# Patient Record
Sex: FEMALE | Race: ASIAN | Employment: UNEMPLOYED | URBAN - METROPOLITAN AREA
[De-identification: names, ages, dates, MRNs, and addresses within clinical notes are randomized per-mention and may not be internally consistent; named-entity substitution may affect disease eponyms.]

---

## 2017-02-24 ENCOUNTER — OFFICE VISIT (OUTPATIENT)
Dept: FAMILY MEDICINE CLINIC | Age: 37
End: 2017-02-24

## 2017-02-24 VITALS
BODY MASS INDEX: 21.62 KG/M2 | DIASTOLIC BLOOD PRESSURE: 69 MMHG | OXYGEN SATURATION: 98 % | WEIGHT: 122 LBS | SYSTOLIC BLOOD PRESSURE: 108 MMHG | HEART RATE: 79 BPM | RESPIRATION RATE: 20 BRPM | TEMPERATURE: 98.3 F | HEIGHT: 63 IN

## 2017-02-24 DIAGNOSIS — K21.9 GASTROESOPHAGEAL REFLUX DISEASE, ESOPHAGITIS PRESENCE NOT SPECIFIED: ICD-10-CM

## 2017-02-24 DIAGNOSIS — E55.9 VITAMIN D DEFICIENCY, UNSPECIFIED: ICD-10-CM

## 2017-02-24 DIAGNOSIS — Z00.00 ENCOUNTER FOR WELL ADULT EXAM WITHOUT ABNORMAL FINDINGS: Primary | ICD-10-CM

## 2017-02-24 DIAGNOSIS — E78.00 ELEVATED LDL CHOLESTEROL LEVEL: ICD-10-CM

## 2017-02-24 RX ORDER — RANITIDINE 150 MG/1
150 TABLET, FILM COATED ORAL 2 TIMES DAILY
Qty: 60 TAB | Refills: 0 | Status: SHIPPED | OUTPATIENT
Start: 2017-02-24 | End: 2017-03-26

## 2017-02-24 NOTE — PATIENT INSTRUCTIONS

## 2017-02-24 NOTE — MR AVS SNAPSHOT
Visit Information Date & Time Provider Department Dept. Phone Encounter #  
 2/24/2017  9:15 AM Khadra Thomas MD [facility] [phone] [id number] Upcoming Health Maintenance Date Due  
 PAP AKA CERVICAL CYTOLOGY 3/23/2001 INFLUENZA AGE 9 TO ADULT 8/1/2016 DTaP/Tdap/Td series (2 - Td) 12/22/2020 Allergies as of 2/24/2017  Review Complete On: 2/24/2017 By: Kevin Martins Severity Noted Reaction Type Reactions Mushroom Combination No.1  03/10/2014    Hives Current Immunizations  Reviewed on 2/24/2017 Name Date Influenza Vaccine (Quad) PF 11/3/2015 TDAP Vaccine 12/22/2010 Reviewed by Kevin Martins on 2/24/2017 at  9:30 AM  
You Were Diagnosed With   
  
 Codes Comments Elevated LDL cholesterol level    -  Primary ICD-10-CM: E78.00 ICD-9-CM: 272.0 Vitamin D deficiency, unspecified     ICD-10-CM: E55.9 ICD-9-CM: 268.9 Gastroesophageal reflux disease, esophagitis presence not specified     ICD-10-CM: K21.9 ICD-9-CM: 530.81 Vitals BP  
  
  
  
  
  
 108/69 BMI and BSA Data Body Mass Index Body Surface Area  
 21.61 kg/m 2 1.57 m 2 Preferred Pharmacy Pharmacy Name Phone [id number] [address] [facility] [phone] Your Updated Medication List  
  
   
This list is accurate as of: 2/24/17 10:01 AM.  Always use your most recent med list.  
  
  
  
  
 fluticasone 50 mcg/actuation nasal spray Commonly known as:  FLONASE  
2 sprays each nostril BID for 3 days and then daily for additional 11 days  
  
 multivitamin tablet Commonly known as:  ONE A DAY Take 1 Tab by mouth daily. raNITIdine 150 mg tablet Commonly known as:  ZANTAC Take 1 Tab by mouth two (2) times a day for 30 days. TYLENOL 325 mg tablet Generic drug:  acetaminophen Take  by mouth every four (4) hours as needed for Pain. Prescriptions Sent to Pharmacy Refills  
 raNITIdine (ZANTAC) 150 mg tablet 0 Sig: Take 1 Tab by mouth two (2) times a day for 30 days. Class: Normal  
 Pharmacy: [address] #: [phone] Route: Oral  
  
We Performed the Following LIPID PANEL [96263 CPT(R)] TSH REFLEX TO T4 [[id number] Custom] VITAMIN D, 1, 25 DIHYDROXY [20031 CPT(R)] Patient Instructions Gastroesophageal Reflux Disease (GERD): Care Instructions Your Care Instructions Gastroesophageal reflux disease (GERD) is the backward flow of stomach acid into the esophagus. The esophagus is the tube that leads from your throat to your stomach. A one-way valve prevents the stomach acid from moving up into this tube. When you have GERD, this valve does not close tightly enough. If you have mild GERD symptoms including heartburn, you may be able to control the problem with antacids or over-the-counter medicine. Changing your diet, losing weight, and making other lifestyle changes can also help reduce symptoms. Follow-up care is a key part of your treatment and safety. Be sure to make and go to all appointments, and call your doctor if you are having problems. Its also a good idea to know your test results and keep a list of the medicines you take. How can you care for yourself at home? · Take your medicines exactly as prescribed. Call your doctor if you think you are having a problem with your medicine. · Your doctor may recommend over-the-counter medicine. For mild or occasional indigestion, antacids, such as Tums, Gaviscon, Mylanta, or Maalox, may help. Your doctor also may recommend over-the-counter acid reducers, such as Pepcid AC, Tagamet HB, Zantac 75, or Prilosec. Read and follow all instructions on the label. If you use these medicines often, talk with your doctor. · Change your eating habits. ¨ Its best to eat several small meals instead of two or three large meals. ¨ After you eat, wait 2 to 3 hours before you lie down. ¨ Chocolate, mint, and alcohol can make GERD worse. ¨ Spicy foods, foods that have a lot of acid (like tomatoes and oranges), and coffee can make GERD symptoms worse in some people. If your symptoms are worse after you eat a certain food, you may want to stop eating that food to see if your symptoms get better. · Do not smoke or chew tobacco. Smoking can make GERD worse. If you need help quitting, talk to your doctor about stop-smoking programs and medicines. These can increase your chances of quitting for good. · If you have GERD symptoms at night, raise the head of your bed 6 to 8 inches by putting the frame on blocks or placing a foam wedge under the head of your mattress. (Adding extra pillows does not work.) · Do not wear tight clothing around your middle. · Lose weight if you need to. Losing just 5 to 10 pounds can help. When should you call for help? Call your doctor now or seek immediate medical care if: 
· You have new or different belly pain. · Your stools are black and tarlike or have streaks of blood. Watch closely for changes in your health, and be sure to contact your doctor if: 
· Your symptoms have not improved after 2 days. · Food seems to catch in your throat or chest. 
Where can you learn more? Go to http://kolby-aicha.info/. Enter S122 in the search box to learn more about \"Gastroesophageal Reflux Disease (GERD): Care Instructions. \" Current as of: August 9, 2016 Content Version: 11.1 © 4495-7923 [employer]. Care instructions adapted under license by [employer] (which disclaims liability or warranty for this information).  If you have questions about a medical condition or this instruction, always ask your healthcare professional. [employer] disclaims any warranty or liability for your use of this information. Introducing [facility]! Debbie Campos introduces [employer] patient portal. Now you can access parts of your medical record, email your doctor's office, and request medication refills online. 1. In your internet browser, go to [email or web address] 2. Click on the First Time User? Click Here link in the Sign In box. You will see the New Member Sign Up page. 3. Enter your [employer] Access Code exactly as it appears below. You will not need to use this code after youve completed the sign-up process. If you do not sign up before the expiration date, you must request a new code. · [employer] Access Code: [email or web address] Expires: 5/25/2017  9:44 AM 
 
4. Enter the last four digits of your Social Security Number (xxxx) and Date of Birth (mm/dd/yyyy) as indicated and click Submit. You will be taken to the next sign-up page. 5. Create a [employer] ID. This will be your [employer] login ID and cannot be changed, so think of one that is secure and easy to remember. 6. Create a [employer] password. You can change your password at any time. 7. Enter your Password Reset Question and Answer. This can be used at a later time if you forget your password. 8. Enter your e-mail address. You will receive e-mail notification when new information is available in [address] 9. Click Sign Up. You can now view and download portions of your medical record. 10. Click the Download Summary menu link to download a portable copy of your medical information. If you have questions, please visit the Frequently Asked Questions section of the [employer] website. Remember, [employer] is NOT to be used for urgent needs. For medical emergencies, dial 911. Now available from your iPhone and Android! Please provide this summary of care documentation to your next provider. Your primary care clinician is listed as Rhonda Pat. If you have any questions after today's visit, please call [phone].

## 2017-02-24 NOTE — PROGRESS NOTES
[address] Medicine Residency [facility]       Subjective  Tegan Fulton is an 39 y.o. female who presents for complete physical exam.  She needs to be seen in clinic for her new insurance    Doing well. The patient's only complaint today is some acid reflux that has affected her since delivery of her baby. She reports that she does take Tums for this, and she gets some relief that does not last very long. She is asking if referral to a specialist is needed. She was seen over a year ago at a [facility]. She was found to have vitamin D deficiency and slightly elevated LDL at that time. She was stated on a course of vitamin D, and she completed the course. She does not remember how long she took this medicine. Her vitamin D was also not rechecked following this as well. Diet: Vegetarian. Good variety. Gets good protein. Exercise: Patient is active. Allergies - reviewed: Allergies   Allergen Reactions    Mushroom Combination No.1 Hives         Medications - reviewed:  Current Outpatient Prescriptions   Medication Sig    raNITIdine (ZANTAC) 150 mg tablet Take 1 Tab by mouth two (2) times a day for 30 days.  fluticasone (FLONASE) 50 mcg/actuation nasal spray 2 sprays each nostril BID for 3 days and then daily for additional 11 days    acetaminophen (TYLENOL) 325 mg tablet Take  by mouth every four (4) hours as needed for Pain.  multivitamin (ONE A DAY) tablet Take 1 Tab by mouth daily. No current facility-administered medications for this visit. Past Medical History - reviewed:  Past Medical History:   Diagnosis Date    Arm fracture, left     at age 8    Diabetes [facility])     gestional diabetes, diet controled    Gestational diabetes     Vegetarian 11/21/2012    Vitamin D deficiency 11/21/2012         Past Surgical History - reviewed:  History reviewed. No pertinent surgical history.       Family History - reviewed:  Family History   Problem Relation Age of Onset    Diabetes Mother     Asthma Mother     Hypertension Mother     Diabetes Maternal Grandfather    Baldwin Shaker Elevated Lipids Father          Social History - reviewed:  Social History     Social History    Marital status:      Spouse name: N/A    Number of children: 0    Years of education: N/A     Occupational History    Not working at present    [email or web address] for [employer] exam      Social History Main Topics    Smoking status: Never Smoker    Smokeless tobacco: Never Used    Alcohol use 0.0 oz/week     0 - 1 Glasses of wine per week      Comment: occasional glass of wine, but usually very rare    Drug use: No    Sexual activity: Yes     Partners: Male     Birth control/ protection: Condom     Other Topics Concern    Caffeine Concern No     2-3 cups of coffee or tea a day    Stress Concern No    Weight Concern No     pretty stable in the ~ 100-110 range x years    Special Diet Yes     Lifelong Strict Vegetarian     Exercise No     on and off but not lately since the summer, occasionally goes for a walk     Social History Narrative    Moved from [facility] to [place] for the second time in 4/2010 (first time was briefly in 2009); Native of [place]; Lived in [facility] [place] x 8 yrs [phone]         Immunizations - reviewed:   Immunization History   Administered Date(s) Administered    Influenza Vaccine (Quad) PF 11/03/2015    TDAP Vaccine 12/22/2010     Flu: has not received this morning. Tdap: up to date. Health Maintenance reviewed - patient is up to date on pap testing (sees outside GYN provider). Review of Systems  Review of Systems   Constitutional: Negative for activity change, chills and fever. HENT: Negative for congestion and trouble swallowing. Respiratory: Negative for cough and shortness of breath. Cardiovascular: Negative for chest pain.    Gastrointestinal: Negative for abdominal pain, blood in stool, constipation and vomiting. Reflux. Genitourinary: Negative for dysuria. Musculoskeletal: Negative for arthralgias. Neurological: Negative for headaches. All other systems reviewed and are negative. Physical Exam  Physical Exam   Constitutional: She is oriented to person, place, and time. She appears well-developed and well-nourished. No distress. HENT:   Head: Normocephalic and atraumatic. Right Ear: External ear normal.   Left Ear: External ear normal.   Eyes: Conjunctivae and EOM are normal. Pupils are equal, round, and reactive to light. Neck: Normal range of motion. No thyromegaly present. Cardiovascular: Normal rate, regular rhythm, normal heart sounds and intact distal pulses. Exam reveals no gallop and no friction rub. No murmur heard. Pulmonary/Chest: Effort normal and breath sounds normal. No respiratory distress. She has no wheezes. She has no rales. She exhibits no tenderness. Abdominal: Soft. Bowel sounds are normal. She exhibits no distension and no mass. There is no tenderness. There is no rebound and no guarding. Lymphadenopathy:     She has no cervical adenopathy. Neurological: She is alert and oriented to person, place, and time. No cranial nerve deficit. Skin: Skin is warm and dry. No rash noted. No erythema. No pallor. Assessment:       ICD-10-CM ICD-9-CM    1. Encounter for well adult exam without abnormal findings Z00.00 V70.0    2. Elevated LDL cholesterol level E78.00 272.0 LIPID PANEL      TSH REFLEX TO T4   3. Vitamin D deficiency, unspecified E55.9 268.9 VITAMIN D, 1, 25 DIHYDROXY   4. Gastroesophageal reflux disease, esophagitis presence not specified K21.9 530.81 raNITIdine (ZANTAC) 150 mg tablet            Plan:   · Counseled re: diet, exercise, healthy lifestyle. Patient is doing well in this regard. · For patient's acid reflux (GERD), I ordered Zantac 150mg BID. I gave a 1 month supply.   She can get this refilled after 1 month if it works for her.  · I ordered some labs today because of her history:  Lipid panel, vitamin D, TSH. · Since we do not have any flu vaccine in the office, I directed the patient to the pharmacy for this. I suggested her having this done when she picks up her Zantac today. · Appropriate labs, vaccines, imaging studies, and referrals ordered as listed above      Follow-up Disposition:  Return if symptoms worsen or fail to improve. I have discussed the diagnosis with the patient and the intended plan as seen in the above orders. The patient has received an after-visit summary and questions were answered concerning future plans. I have discussed medication side effects and warnings with the patient as well. Informed pt to return to the office if new symptoms arise. Patient was discussed with Dr. Yue Estrada.     Ana Cristina Estrada MD  Family Medicine Resident

## 2017-02-28 LAB
1,25(OH)2D3 SERPL-MCNC: 49.2 PG/ML (ref 19.9–79.3)
CHOLEST SERPL-MCNC: 180 MG/DL (ref 100–199)
HDLC SERPL-MCNC: 49 MG/DL
INTERPRETATION, 910389: NORMAL
LDLC SERPL CALC-MCNC: 109 MG/DL (ref 0–99)
TRIGL SERPL-MCNC: 111 MG/DL (ref 0–149)
TSH SERPL DL<=0.005 MIU/L-ACNC: 2.33 UIU/ML (ref 0.45–4.5)
VLDLC SERPL CALC-MCNC: 22 MG/DL (ref 5–40)

## 2017-02-28 NOTE — PROGRESS NOTES
Results reviewed. All labs (including vitamin D) within normal limits. Will mail letter to patient with results.

## 2022-03-18 PROBLEM — K21.9 GASTROESOPHAGEAL REFLUX DISEASE: Status: ACTIVE | Noted: 2017-02-24

## 2022-03-19 PROBLEM — E55.9 VITAMIN D DEFICIENCY, UNSPECIFIED: Status: ACTIVE | Noted: 2017-02-24

## 2022-03-19 PROBLEM — E78.00 ELEVATED LDL CHOLESTEROL LEVEL: Status: ACTIVE | Noted: 2017-02-24

## 2024-12-11 ENCOUNTER — OFFICE VISIT (OUTPATIENT)
Age: 44
End: 2024-12-11

## 2024-12-11 VITALS
TEMPERATURE: 98 F | HEART RATE: 92 BPM | HEIGHT: 62 IN | SYSTOLIC BLOOD PRESSURE: 93 MMHG | BODY MASS INDEX: 22.82 KG/M2 | RESPIRATION RATE: 16 BRPM | WEIGHT: 124 LBS | DIASTOLIC BLOOD PRESSURE: 62 MMHG | OXYGEN SATURATION: 95 %

## 2024-12-11 DIAGNOSIS — J98.8 RESPIRATORY INFECTION: Primary | ICD-10-CM

## 2024-12-11 DIAGNOSIS — R51.9 ACUTE INTRACTABLE HEADACHE, UNSPECIFIED HEADACHE TYPE: ICD-10-CM

## 2024-12-11 LAB
INFLUENZA A ANTIGEN, POC: NEGATIVE
INFLUENZA B ANTIGEN, POC: NEGATIVE
Lab: NORMAL
PERFORMING INSTRUMENT: NORMAL
QC PASS/FAIL: NORMAL
SARS-COV-2, POC: NORMAL

## 2024-12-11 RX ORDER — GUAIFENESIN/DEXTROMETHORPHAN 100-10MG/5
5 SYRUP ORAL 3 TIMES DAILY PRN
Qty: 120 ML | Refills: 0 | Status: SHIPPED | OUTPATIENT
Start: 2024-12-11 | End: 2024-12-21

## 2024-12-11 RX ORDER — DEXTROMETHORPHAN HYDROBROMIDE AND PROMETHAZINE HYDROCHLORIDE 15; 6.25 MG/5ML; MG/5ML
5 SYRUP ORAL 4 TIMES DAILY PRN
Qty: 140 ML | Refills: 0 | Status: SHIPPED | OUTPATIENT
Start: 2024-12-11 | End: 2024-12-18

## 2024-12-11 RX ORDER — IBUPROFEN 200 MG
600 TABLET ORAL ONCE
Status: COMPLETED | OUTPATIENT
Start: 2024-12-11 | End: 2024-12-11

## 2024-12-11 RX ORDER — KETOROLAC TROMETHAMINE 30 MG/ML
30 INJECTION, SOLUTION INTRAMUSCULAR; INTRAVENOUS ONCE
Status: DISCONTINUED | OUTPATIENT
Start: 2024-12-11 | End: 2024-12-11

## 2024-12-11 RX ADMIN — Medication 600 MG: at 12:47

## 2024-12-11 ASSESSMENT — ENCOUNTER SYMPTOMS
SINUS PAIN: 0
GASTROINTESTINAL NEGATIVE: 1
WHEEZING: 0
EYES NEGATIVE: 1
CHEST TIGHTNESS: 0
SORE THROAT: 1
ALLERGIC/IMMUNOLOGIC NEGATIVE: 1
SHORTNESS OF BREATH: 0
COUGH: 1

## 2024-12-11 ASSESSMENT — PAIN DESCRIPTION - LOCATION: LOCATION: THROAT;HEAD

## 2024-12-11 ASSESSMENT — PAIN DESCRIPTION - DESCRIPTORS: DESCRIPTORS: ACHING;SORE

## 2024-12-11 ASSESSMENT — PAIN SCALES - GENERAL: PAINLEVEL_OUTOF10: 4

## 2024-12-11 NOTE — PROGRESS NOTES
Subjective     Chief Complaint   Patient presents with    Cough     Pt c/o cough, sx started last week .     Congestion     Pt c/o congestion, sx started last week.     Other     Pt c/o chills and feeling cold, sx started last week.        Pt is a 44 year old female who came into the clinic with a productive cough, headache w/ dizziness, sore throat, congestion, fever, chills, and weakness X 1 week. Pt reports her symptoms started after coming home from travelling. Pt reports no relief from Tylenol, Mucinex, and day & night cough meds.      Cough  Associated symptoms include chills, a fever, headaches and a sore throat. Pertinent negatives include no ear pain, shortness of breath or wheezing.       History reviewed. No pertinent past medical history.    History reviewed. No pertinent surgical history.    History reviewed. No pertinent family history.    No Known Allergies    Social History     Tobacco Use    Smoking status: Never    Smokeless tobacco: Never   Vaping Use    Vaping status: Never Used   Substance Use Topics    Alcohol use: Never    Drug use: Never       Vitals:    12/11/24 1253   BP: 93/62   Pulse:    Resp:    Temp:    SpO2:        Review of Systems   Constitutional:  Positive for chills, fatigue and fever. Negative for appetite change, diaphoresis and unexpected weight change.   HENT:  Positive for congestion and sore throat. Negative for ear pain, hearing loss and sinus pain.    Eyes: Negative.    Respiratory:  Positive for cough. Negative for chest tightness, shortness of breath and wheezing.    Cardiovascular: Negative.    Gastrointestinal: Negative.    Endocrine: Negative.    Genitourinary: Negative.    Musculoskeletal: Negative.    Skin: Negative.    Allergic/Immunologic: Negative.    Neurological:  Positive for dizziness and headaches. Negative for syncope, speech difficulty and numbness.   Hematological: Negative.    Psychiatric/Behavioral: Negative.         Objective     Physical Exam  Vitals

## 2024-12-11 NOTE — PATIENT INSTRUCTIONS
Thank you for visiting [facility] today.    For relief at home, you may use the following to help with your cough:  Throat lozenges, hot tea or honey  Minimize contact with irritants such as cigarette smoke  Zyrtec, Claritin, Allegra or Xyzal may provide relief  Ibuprofen/Tylenol for pain or muscle aches.  Do not take ibuprofen if you have been prescribed prednisone.  Vicks VapoRub on the soles of feet with socks at night time  Saline nasal spray 8-10 times daily  Increase humidification in your home, preferably cool mist  Cough medications as prescribed  Vitamin C 75-90 mg daily  Zinc 40 mg daily    Follow up with your PCP if no improvement in 2 weeks.